# Patient Record
Sex: FEMALE | Race: WHITE | NOT HISPANIC OR LATINO
[De-identification: names, ages, dates, MRNs, and addresses within clinical notes are randomized per-mention and may not be internally consistent; named-entity substitution may affect disease eponyms.]

---

## 2017-07-24 ENCOUNTER — FORM ENCOUNTER (OUTPATIENT)
Age: 67
End: 2017-07-24

## 2017-07-25 ENCOUNTER — OUTPATIENT (OUTPATIENT)
Dept: OUTPATIENT SERVICES | Facility: HOSPITAL | Age: 67
LOS: 1 days | End: 2017-07-25
Payer: MEDICARE

## 2017-07-25 ENCOUNTER — APPOINTMENT (OUTPATIENT)
Dept: ORTHOPEDIC SURGERY | Facility: CLINIC | Age: 67
End: 2017-07-25
Payer: MEDICARE

## 2017-07-25 VITALS
BODY MASS INDEX: 22.02 KG/M2 | DIASTOLIC BLOOD PRESSURE: 70 MMHG | WEIGHT: 137 LBS | HEIGHT: 66 IN | SYSTOLIC BLOOD PRESSURE: 120 MMHG

## 2017-07-25 PROCEDURE — 73564 X-RAY EXAM KNEE 4 OR MORE: CPT

## 2017-07-25 PROCEDURE — 99214 OFFICE O/P EST MOD 30 MIN: CPT

## 2017-07-25 PROCEDURE — 73564 X-RAY EXAM KNEE 4 OR MORE: CPT | Mod: 26,50

## 2017-11-02 ENCOUNTER — APPOINTMENT (OUTPATIENT)
Dept: OPHTHALMOLOGY | Facility: CLINIC | Age: 67
End: 2017-11-02
Payer: MEDICARE

## 2017-11-02 PROCEDURE — 92136 OPHTHALMIC BIOMETRY: CPT

## 2017-11-02 PROCEDURE — 92004 COMPRE OPH EXAM NEW PT 1/>: CPT

## 2017-11-02 PROCEDURE — 92286 ANT SGM IMG I&R SPECLR MIC: CPT

## 2017-11-02 PROCEDURE — 76514 ECHO EXAM OF EYE THICKNESS: CPT

## 2017-11-03 ENCOUNTER — APPOINTMENT (OUTPATIENT)
Dept: ORTHOPEDIC SURGERY | Facility: CLINIC | Age: 67
End: 2017-11-03
Payer: MEDICARE

## 2017-11-03 PROCEDURE — 20610 DRAIN/INJ JOINT/BURSA W/O US: CPT | Mod: LT

## 2017-11-13 ENCOUNTER — TRANSCRIPTION ENCOUNTER (OUTPATIENT)
Age: 67
End: 2017-11-13

## 2017-12-29 ENCOUNTER — MEDICATION RENEWAL (OUTPATIENT)
Age: 67
End: 2017-12-29

## 2018-01-08 ENCOUNTER — OUTPATIENT (OUTPATIENT)
Dept: OUTPATIENT SERVICES | Facility: HOSPITAL | Age: 68
LOS: 1 days | Discharge: HOME | End: 2018-01-08

## 2018-01-08 ENCOUNTER — APPOINTMENT (OUTPATIENT)
Dept: OPHTHALMOLOGY | Facility: AMBULATORY SURGERY CENTER | Age: 68
End: 2018-01-08
Payer: MEDICARE

## 2018-01-08 PROCEDURE — 66984 XCAPSL CTRC RMVL W/O ECP: CPT | Mod: LT

## 2018-01-09 ENCOUNTER — APPOINTMENT (OUTPATIENT)
Dept: OPHTHALMOLOGY | Facility: CLINIC | Age: 68
End: 2018-01-09
Payer: COMMERCIAL

## 2018-01-09 PROCEDURE — 99024 POSTOP FOLLOW-UP VISIT: CPT

## 2018-01-16 ENCOUNTER — APPOINTMENT (OUTPATIENT)
Dept: OPHTHALMOLOGY | Facility: CLINIC | Age: 68
End: 2018-01-16
Payer: MEDICARE

## 2018-01-16 PROCEDURE — 99024 POSTOP FOLLOW-UP VISIT: CPT

## 2018-02-06 ENCOUNTER — APPOINTMENT (OUTPATIENT)
Dept: OPHTHALMOLOGY | Facility: CLINIC | Age: 68
End: 2018-02-06
Payer: MEDICARE

## 2018-02-06 PROCEDURE — 99024 POSTOP FOLLOW-UP VISIT: CPT

## 2018-02-28 ENCOUNTER — APPOINTMENT (OUTPATIENT)
Dept: OPHTHALMOLOGY | Facility: CLINIC | Age: 68
End: 2018-02-28
Payer: MEDICARE

## 2018-02-28 DIAGNOSIS — H25.812 COMBINED FORMS OF AGE-RELATED CATARACT, LEFT EYE: ICD-10-CM

## 2018-02-28 PROCEDURE — 92225: CPT | Mod: RT

## 2018-02-28 PROCEDURE — 92014 COMPRE OPH EXAM EST PT 1/>: CPT | Mod: 57

## 2018-02-28 PROCEDURE — 92134 CPTRZ OPH DX IMG PST SGM RTA: CPT

## 2018-02-28 PROCEDURE — 67145 PROPH RTA DTCHMNT PC: CPT | Mod: 79,LT

## 2018-03-02 ENCOUNTER — APPOINTMENT (OUTPATIENT)
Dept: OPHTHALMOLOGY | Facility: CLINIC | Age: 68
End: 2018-03-02
Payer: MEDICARE

## 2018-03-02 DIAGNOSIS — Z87.828 PERSONAL HISTORY OF OTHER (HEALED) PHYSICAL INJURY AND TRAUMA: ICD-10-CM

## 2018-03-02 PROCEDURE — 92226: CPT | Mod: LT

## 2018-03-02 PROCEDURE — 99024 POSTOP FOLLOW-UP VISIT: CPT

## 2018-03-08 ENCOUNTER — APPOINTMENT (OUTPATIENT)
Dept: OPHTHALMOLOGY | Facility: CLINIC | Age: 68
End: 2018-03-08
Payer: MEDICARE

## 2018-03-08 PROCEDURE — 99024 POSTOP FOLLOW-UP VISIT: CPT

## 2018-03-28 ENCOUNTER — APPOINTMENT (OUTPATIENT)
Dept: OPHTHALMOLOGY | Facility: CLINIC | Age: 68
End: 2018-03-28
Payer: MEDICARE

## 2018-03-28 PROCEDURE — 99024 POSTOP FOLLOW-UP VISIT: CPT

## 2018-05-08 ENCOUNTER — APPOINTMENT (OUTPATIENT)
Dept: ORTHOPEDIC SURGERY | Facility: CLINIC | Age: 68
End: 2018-05-08
Payer: MEDICARE

## 2018-05-08 VITALS
SYSTOLIC BLOOD PRESSURE: 120 MMHG | WEIGHT: 139 LBS | BODY MASS INDEX: 22.34 KG/M2 | DIASTOLIC BLOOD PRESSURE: 70 MMHG | HEIGHT: 66 IN

## 2018-05-08 PROCEDURE — 20610 DRAIN/INJ JOINT/BURSA W/O US: CPT | Mod: 50

## 2018-05-08 PROCEDURE — 99214 OFFICE O/P EST MOD 30 MIN: CPT | Mod: 25

## 2018-05-08 RX ORDER — MOXIFLOXACIN OPHTHALMIC 5 MG/ML
0.5 SOLUTION/ DROPS OPHTHALMIC 4 TIMES DAILY
Qty: 3 | Refills: 1 | Status: COMPLETED | COMMUNITY
Start: 2017-12-29 | End: 2018-05-08

## 2018-05-08 RX ORDER — DIFLUPREDNATE 0.5 MG/ML
0.05 EMULSION OPHTHALMIC
Qty: 5 | Refills: 6 | Status: COMPLETED | COMMUNITY
Start: 2017-12-29 | End: 2018-05-08

## 2018-05-09 ENCOUNTER — APPOINTMENT (OUTPATIENT)
Dept: OPHTHALMOLOGY | Facility: CLINIC | Age: 68
End: 2018-05-09
Payer: MEDICARE

## 2018-05-09 DIAGNOSIS — H35.413 LATTICE DEGENERATION OF RETINA, BILATERAL: ICD-10-CM

## 2018-05-09 PROCEDURE — 99024 POSTOP FOLLOW-UP VISIT: CPT

## 2018-06-13 ENCOUNTER — RX RENEWAL (OUTPATIENT)
Age: 68
End: 2018-06-13

## 2018-07-12 ENCOUNTER — APPOINTMENT (OUTPATIENT)
Dept: ORTHOPEDIC SURGERY | Facility: CLINIC | Age: 68
End: 2018-07-12
Payer: MEDICARE

## 2018-07-12 PROCEDURE — 99214 OFFICE O/P EST MOD 30 MIN: CPT | Mod: 25

## 2018-07-12 PROCEDURE — 20610 DRAIN/INJ JOINT/BURSA W/O US: CPT | Mod: 50

## 2018-09-14 ENCOUNTER — RX RENEWAL (OUTPATIENT)
Age: 68
End: 2018-09-14

## 2018-10-30 ENCOUNTER — APPOINTMENT (OUTPATIENT)
Dept: ORTHOPEDIC SURGERY | Facility: CLINIC | Age: 68
End: 2018-10-30
Payer: MEDICARE

## 2018-10-30 VITALS — SYSTOLIC BLOOD PRESSURE: 120 MMHG | OXYGEN SATURATION: 98 % | DIASTOLIC BLOOD PRESSURE: 80 MMHG | HEART RATE: 79 BPM

## 2018-10-30 DIAGNOSIS — S83.241A OTHER TEAR OF MEDIAL MENISCUS, CURRENT INJURY, RIGHT KNEE, INITIAL ENCOUNTER: ICD-10-CM

## 2018-10-30 PROCEDURE — 99214 OFFICE O/P EST MOD 30 MIN: CPT

## 2018-11-07 ENCOUNTER — APPOINTMENT (OUTPATIENT)
Dept: OPHTHALMOLOGY | Facility: CLINIC | Age: 68
End: 2018-11-07
Payer: MEDICARE

## 2018-11-07 DIAGNOSIS — H35.433 PAVING STONE DEGENERATION OF RETINA, BILATERAL: ICD-10-CM

## 2018-11-07 DIAGNOSIS — Z96.1 PRESENCE OF INTRAOCULAR LENS: ICD-10-CM

## 2018-11-07 DIAGNOSIS — H33.312 HORSESHOE TEAR OF RETINA W/OUT DETACHMENT, LEFT EYE: ICD-10-CM

## 2018-11-07 DIAGNOSIS — H25.811 COMBINED FORMS OF AGE-RELATED CATARACT, RIGHT EYE: ICD-10-CM

## 2018-11-07 DIAGNOSIS — H18.51 ENDOTHELIAL CORNEAL DYSTROPHY: ICD-10-CM

## 2018-11-07 DIAGNOSIS — H43.811 VITREOUS DEGENERATION, RIGHT EYE: ICD-10-CM

## 2018-11-07 DIAGNOSIS — H43.812 VITREOUS DEGENERATION, LEFT EYE: ICD-10-CM

## 2018-11-07 DIAGNOSIS — H35.372 PUCKERING OF MACULA, LEFT EYE: ICD-10-CM

## 2018-11-07 PROBLEM — S83.241A ACUTE MEDIAL MENISCUS TEAR OF RIGHT KNEE, INITIAL ENCOUNTER: Status: ACTIVE | Noted: 2018-10-30

## 2018-11-07 PROCEDURE — 92134 CPTRZ OPH DX IMG PST SGM RTA: CPT

## 2018-11-07 PROCEDURE — 92014 COMPRE OPH EXAM EST PT 1/>: CPT

## 2018-12-11 ENCOUNTER — APPOINTMENT (OUTPATIENT)
Dept: ORTHOPEDIC SURGERY | Facility: CLINIC | Age: 68
End: 2018-12-11
Payer: MEDICARE

## 2018-12-11 VITALS
HEART RATE: 85 BPM | OXYGEN SATURATION: 98 % | SYSTOLIC BLOOD PRESSURE: 120 MMHG | WEIGHT: 139 LBS | HEIGHT: 66 IN | DIASTOLIC BLOOD PRESSURE: 70 MMHG | BODY MASS INDEX: 22.34 KG/M2

## 2018-12-11 PROCEDURE — 20610 DRAIN/INJ JOINT/BURSA W/O US: CPT | Mod: 50

## 2018-12-11 PROCEDURE — 99213 OFFICE O/P EST LOW 20 MIN: CPT | Mod: 25

## 2019-03-19 ENCOUNTER — APPOINTMENT (OUTPATIENT)
Dept: ORTHOPEDIC SURGERY | Facility: CLINIC | Age: 69
End: 2019-03-19
Payer: MEDICARE

## 2019-03-19 VITALS — OXYGEN SATURATION: 98 % | HEIGHT: 66 IN | WEIGHT: 140 LBS | HEART RATE: 85 BPM | BODY MASS INDEX: 22.5 KG/M2

## 2019-03-19 PROCEDURE — 99213 OFFICE O/P EST LOW 20 MIN: CPT | Mod: 25

## 2019-03-19 PROCEDURE — 20610 DRAIN/INJ JOINT/BURSA W/O US: CPT | Mod: 50

## 2019-03-22 ENCOUNTER — TRANSCRIPTION ENCOUNTER (OUTPATIENT)
Age: 69
End: 2019-03-22

## 2019-03-22 NOTE — PHYSICAL EXAM
[de-identified] : The patient is a well developed, well nourished female in no apparent distress. She is alert and oriented X 3 with a pleasant mood and appropriate affect. \par \par On physical examination of the right knee, there is full range of motion. The patient walks with a normal gait and stands in neutral alignment. There is trace effusion. No warmth or erythema is noted. The patella is tender to palpation medially and laterally. There is patellofemoral crepitus noted. The apprehension and grind tests are negative. The extensor mechanism is intact. There is medial joint line tenderness. The Patricio sign is absent. The Lachman and pivot shift tests are negative. There is no varus or valgus laxity at 0 or 30 degrees. No posterolateral or anteromedial laxity is noted. No masses are palpable. No other soft tissue or bony tenderness is noted. There is some tenderness noted on palpation of the IT band. Quadriceps weakness is noted. Neurovascular function is intact. \par \par On physical examination of the left knee, her ROM is 5-120 degrees. The patient walks with a normal gait and stands in neutral alignment. There is trace effusion. No warmth or erythema is noted. The patella is tender to palpation medially and laterally. There is patellofemoral crepitus noted. The apprehension and grind tests are negative. The extensor mechanism is intact. There is medial joint line tenderness. The Patricio sign is absent. The Lachman and pivot shift tests are negative. There is no varus or valgus laxity at 0 or 30 degrees. No posterolateral or anteromedial laxity is noted. No masses are palpable. No other soft tissue or bony tenderness is noted. There is some tenderness noted on palpation of the IT band. Quadriceps weakness is noted. Neurovascular function is intact. \par

## 2019-03-22 NOTE — HISTORY OF PRESENT ILLNESS
[de-identified] : Kacey returns today for evaluation of both knees. She reports progressive bilateral knee pain and swelling. She has had difficulty walking even short distances. She has difficulty exercising and pain with stairs. She denies any locking or buckling. She did not have much relief with her last cortisone injections in December.

## 2019-03-22 NOTE — DISCUSSION/SUMMARY
[de-identified] : Kacey has symptomatic DJD in both knees. She received Monovisc injections today in both knees. She will slowly increase her activities as tolerated. We will see her back on an as needed basis. All questions were answered. She understands that she will ultimately require knee replacement in the future. She will call if any issues arise.

## 2019-03-22 NOTE — PROCEDURE
[de-identified] : Indication:Bilateral knee DJD \par CC; Bilateral knee pain and stiffness\par \par Under strict sterile technique, both knees were  prepped with Betadine. Using the superolateral approach, with the patient supine, a 4mL injection of Monovisc was administered intra-articularly into each knee. The patient tolerated the procedure well. The patient was instructed to avoid vigorous exercise for 48 hours and will apply ice to the knee for 20 minutes 2-3 times per day if discomfort occurs. Patient will return on an as needed basis. The patient will call if any questions or problems should arise. \par \par MonoVisc injection - Bilateral Knees\par Lot # [id number]\par Exp: 07-\par Man: [employer]\par NDC: [id number]

## 2019-08-07 ENCOUNTER — NON-APPOINTMENT (OUTPATIENT)
Age: 69
End: 2019-08-07

## 2019-08-07 ENCOUNTER — APPOINTMENT (OUTPATIENT)
Dept: OPHTHALMOLOGY | Facility: CLINIC | Age: 69
End: 2019-08-07
Payer: MEDICARE

## 2019-08-07 PROCEDURE — 92014 COMPRE OPH EXAM EST PT 1/>: CPT

## 2019-08-07 PROCEDURE — 92134 CPTRZ OPH DX IMG PST SGM RTA: CPT

## 2019-09-16 ENCOUNTER — RX RENEWAL (OUTPATIENT)
Age: 69
End: 2019-09-16

## 2019-09-24 ENCOUNTER — APPOINTMENT (OUTPATIENT)
Dept: ORTHOPEDIC SURGERY | Facility: CLINIC | Age: 69
End: 2019-09-24
Payer: MEDICARE

## 2019-09-24 PROCEDURE — 20610 DRAIN/INJ JOINT/BURSA W/O US: CPT | Mod: 50

## 2019-10-02 NOTE — PROCEDURE
[de-identified] : Indication:\par \par Under strict sterile technique, both knees were prepped with Betadine. Using the superolateral approach, with the patient supine, a4mL injection of Monovisc was administered intra-articularly into each knee. The patient tolerated the procedure well. The patient was instructed to avoid vigorous exercise for 48 hours and will apply ice to the knee for 20 minutes 2-3 times per day if discomfort occurs. Patient will return on an as needed basis. The patient will call if any questions or problems should arise. \par \par \par \par MonoVisc injection - Bilateral knee joints\par Lot #: [id number]\par Exp: 03-31-20222\par Man: [employer]\par NDC: 56611-87928-3

## 2019-10-18 ENCOUNTER — APPOINTMENT (OUTPATIENT)
Dept: OPHTHALMOLOGY | Facility: CLINIC | Age: 69
End: 2019-10-18
Payer: MEDICARE

## 2019-10-18 ENCOUNTER — NON-APPOINTMENT (OUTPATIENT)
Age: 69
End: 2019-10-18

## 2019-10-18 PROCEDURE — 92250 FUNDUS PHOTOGRAPHY W/I&R: CPT

## 2019-10-18 PROCEDURE — 92012 INTRM OPH EXAM EST PATIENT: CPT

## 2019-11-18 ENCOUNTER — APPOINTMENT (OUTPATIENT)
Dept: OPHTHALMOLOGY | Facility: CLINIC | Age: 69
End: 2019-11-18
Payer: MEDICARE

## 2019-11-18 ENCOUNTER — NON-APPOINTMENT (OUTPATIENT)
Age: 69
End: 2019-11-18

## 2019-11-18 PROCEDURE — 92226: CPT | Mod: LT

## 2019-11-18 PROCEDURE — 92014 COMPRE OPH EXAM EST PT 1/>: CPT

## 2019-11-18 PROCEDURE — 92134 CPTRZ OPH DX IMG PST SGM RTA: CPT

## 2020-01-10 ENCOUNTER — RX RENEWAL (OUTPATIENT)
Age: 70
End: 2020-01-10

## 2020-04-20 ENCOUNTER — NON-APPOINTMENT (OUTPATIENT)
Age: 70
End: 2020-04-20

## 2020-04-20 ENCOUNTER — APPOINTMENT (OUTPATIENT)
Dept: OPHTHALMOLOGY | Facility: CLINIC | Age: 70
End: 2020-04-20
Payer: MEDICARE

## 2020-04-20 PROCEDURE — 99213 OFFICE O/P EST LOW 20 MIN: CPT | Mod: 95

## 2020-08-11 ENCOUNTER — APPOINTMENT (OUTPATIENT)
Dept: OPHTHALMOLOGY | Facility: CLINIC | Age: 70
End: 2020-08-11

## 2020-08-12 ENCOUNTER — APPOINTMENT (OUTPATIENT)
Dept: OPHTHALMOLOGY | Facility: CLINIC | Age: 70
End: 2020-08-12

## 2020-09-21 ENCOUNTER — APPOINTMENT (OUTPATIENT)
Dept: OPHTHALMOLOGY | Facility: CLINIC | Age: 70
End: 2020-09-21

## 2020-09-22 ENCOUNTER — APPOINTMENT (OUTPATIENT)
Dept: ORTHOPEDIC SURGERY | Facility: CLINIC | Age: 70
End: 2020-09-22
Payer: MEDICARE

## 2020-09-22 PROCEDURE — 20610 DRAIN/INJ JOINT/BURSA W/O US: CPT | Mod: 50

## 2020-09-23 NOTE — PROCEDURE
[de-identified] : CC: bilateral knee pain and stiffness\par DX; Bilateral knee DJD \par \par \par Under strict sterile technique,both knees were prepped with Betadine. Using the superolateral approach, with the patient supine, a 4mL injection of Monovisc was administered intra-articularly into each knee. The patient tolerated the procedure well. The patient was instructed to avoid vigorous exercise for 48 hours and will apply ice to the knee for 20 minutes 2-3 times per day if discomfort occurs. Patient will return on an as needed basis. The patient will call if any questions or problems should arise. \par \par Lot [id number]\par 06/22\par

## 2021-03-01 ENCOUNTER — APPOINTMENT (OUTPATIENT)
Dept: ORTHOPEDIC SURGERY | Facility: CLINIC | Age: 71
End: 2021-03-01
Payer: MEDICARE

## 2021-03-01 VITALS
TEMPERATURE: 98.4 F | HEART RATE: 110 BPM | OXYGEN SATURATION: 98 % | WEIGHT: 140 LBS | DIASTOLIC BLOOD PRESSURE: 90 MMHG | BODY MASS INDEX: 22.5 KG/M2 | SYSTOLIC BLOOD PRESSURE: 160 MMHG | HEIGHT: 66 IN

## 2021-03-01 DIAGNOSIS — M17.12 UNILATERAL PRIMARY OSTEOARTHRITIS, LEFT KNEE: ICD-10-CM

## 2021-03-01 PROCEDURE — 99213 OFFICE O/P EST LOW 20 MIN: CPT

## 2021-03-01 RX ORDER — ETODOLAC 400 MG/1
400 TABLET, FILM COATED ORAL TWICE DAILY
Qty: 60 | Refills: 2 | Status: ACTIVE | COMMUNITY
Start: 2021-03-01 | End: 1900-01-01

## 2021-03-02 NOTE — HISTORY OF PRESENT ILLNESS
[de-identified] : Kacey returns today for evalaution of her left knee. She has a longstanding h.o DJD in her left knee. She has managed well in the past with HA injections. She works with a  several days per week. She was doing well until last week. She developed posterior knee pain and hamstring tightness after working out. She has had loss of motion and difficulty walking. She has been icing and alternating heat on and has been doing a gentle stretching program with some relief. She denies any locking or buckling.

## 2021-03-02 NOTE — END OF VISIT
[FreeTextEntry3] : All medical record entries made by SHELBY Charles, acting as a scribe for this encounter under the direction of lCarke Quevedo MD . I have reviewed the chart and agree that the record accurately reflects my personal performance of the history, physical exam, assessment and plan. I have also personally directed, reviewed, and agreed with the chart.

## 2021-03-02 NOTE — PHYSICAL EXAM
[de-identified] : The patient is a well developed, well nourished female in no apparent distress. She is alert and oriented X 3 with a pleasant mood and appropriate affect. \par \par On physical examination of the right knee, there is full range of motion. The patient walks with a normal gait and stands in neutral alignment. There is trace effusion. No warmth or erythema is noted. The patella is tender to palpation medially and laterally. There is patellofemoral crepitus noted. The apprehension and grind tests are negative. The extensor mechanism is intact. There is medial joint line tenderness. The Patricio sign is absent. The Lachman and pivot shift tests are negative. There is no varus or valgus laxity at 0 or 30 degrees. No posterolateral or anteromedial laxity is noted. No masses are palpable. No other soft tissue or bony tenderness is noted. There is some tenderness noted on palpation of the IT band. Quadriceps weakness is noted. Neurovascular function is intact. \par \par On physical examination of the left knee, her ROM is 5-120 degrees. The patient walks with a normal gait and stands in neutral alignment. There is trace effusion. No warmth or erythema is noted. The patella is tender to palpation medially and laterally. There is patellofemoral crepitus noted. The apprehension and grind tests are negative. The extensor mechanism is intact. There is medial joint line tenderness. The Patricio sign is absent. The Lachman and pivot shift tests are negative. There is no varus or valgus laxity at 0 or 30 degrees. No posterolateral or anteromedial laxity is noted. No masses are palpable. No other soft tissue or bony tenderness is noted. There is some tenderness noted on palpation of the IT band. Quadriceps weakness is noted. Neurovascular function is intact. \par

## 2021-03-02 NOTE — DISCUSSION/SUMMARY
[de-identified] : Kacey has had a flare of her DJD and may have a superimposed hamstring strain. She will begin a two week course of Lodine 400mg bid with food. We will see her back in the spring for another round of HA injections. she understands that she will ultimately require knee replacement. All questions were answered. She will call if any issues arise.

## 2021-03-03 ENCOUNTER — APPOINTMENT (OUTPATIENT)
Dept: OPHTHALMOLOGY | Facility: CLINIC | Age: 71
End: 2021-03-03
Payer: MEDICARE

## 2021-03-03 ENCOUNTER — NON-APPOINTMENT (OUTPATIENT)
Age: 71
End: 2021-03-03

## 2021-03-03 PROCEDURE — 92134 CPTRZ OPH DX IMG PST SGM RTA: CPT

## 2021-03-03 PROCEDURE — 92201 OPSCPY EXTND RTA DRAW UNI/BI: CPT

## 2021-03-03 PROCEDURE — 92014 COMPRE OPH EXAM EST PT 1/>: CPT

## 2021-04-06 ENCOUNTER — APPOINTMENT (OUTPATIENT)
Dept: ORTHOPEDIC SURGERY | Facility: CLINIC | Age: 71
End: 2021-04-06
Payer: MEDICARE

## 2021-04-06 VITALS
WEIGHT: 140 LBS | BODY MASS INDEX: 22.5 KG/M2 | SYSTOLIC BLOOD PRESSURE: 120 MMHG | OXYGEN SATURATION: 98 % | HEART RATE: 102 BPM | HEIGHT: 66 IN | TEMPERATURE: 98.4 F | DIASTOLIC BLOOD PRESSURE: 80 MMHG

## 2021-04-06 PROCEDURE — 20610 DRAIN/INJ JOINT/BURSA W/O US: CPT | Mod: 50

## 2021-04-07 NOTE — PROCEDURE
[de-identified] : cc: bilateral knee pain and stiffness\par DX: bilateral knee DJD \par \par Under strict sterile technique, both knees were  prepped with Betadine. Using the superolateral approach, with the patient supine, a 4mL injection of Monovisc  was administered intra-articularly into each knee The patient tolerated the procedure well. The patient was instructed to avoid vigorous exercise for 48 hours and will apply ice to the knee for 20 minutes 2-3 times per day if discomfort occurs. Patient will return on an as needed basis. The patient will call if any questions or problems should arise.\par \par MonoVisc injection - Bilateral knee joints\par Lot #: [id number]\par Exp: 06-\par Man: [employer]\par NDC: [id number]

## 2021-10-08 ENCOUNTER — NON-APPOINTMENT (OUTPATIENT)
Age: 71
End: 2021-10-08

## 2021-10-08 ENCOUNTER — APPOINTMENT (OUTPATIENT)
Dept: ORTHOPEDIC SURGERY | Facility: CLINIC | Age: 71
End: 2021-10-08
Payer: MEDICARE

## 2021-10-08 ENCOUNTER — RESULT REVIEW (OUTPATIENT)
Age: 71
End: 2021-10-08

## 2021-10-08 ENCOUNTER — OUTPATIENT (OUTPATIENT)
Dept: OUTPATIENT SERVICES | Facility: HOSPITAL | Age: 71
LOS: 1 days | End: 2021-10-08
Payer: MEDICARE

## 2021-10-08 VITALS
TEMPERATURE: 98.2 F | WEIGHT: 140 LBS | HEART RATE: 110 BPM | DIASTOLIC BLOOD PRESSURE: 84 MMHG | OXYGEN SATURATION: 98 % | HEIGHT: 66 IN | BODY MASS INDEX: 22.5 KG/M2 | SYSTOLIC BLOOD PRESSURE: 120 MMHG

## 2021-10-08 DIAGNOSIS — M17.0 BILATERAL PRIMARY OSTEOARTHRITIS OF KNEE: ICD-10-CM

## 2021-10-08 PROCEDURE — 73564 X-RAY EXAM KNEE 4 OR MORE: CPT | Mod: 26,50

## 2021-10-08 PROCEDURE — 20610 DRAIN/INJ JOINT/BURSA W/O US: CPT | Mod: 50

## 2021-10-08 PROCEDURE — 73564 X-RAY EXAM KNEE 4 OR MORE: CPT

## 2021-10-19 PROBLEM — M17.0 PRIMARY OSTEOARTHRITIS OF BOTH KNEES: Status: ACTIVE | Noted: 2018-05-11

## 2021-10-19 NOTE — PROCEDURE
[de-identified] : cc: bilateral knee pain and stiffness\par DX: bilateral knee DJD\par \par Radiographs performed today show advanced tricompartment DJD in both knees\par \par Under strict sterile technique, both knees were prepped with Betadine. Using the superolateral approach, with the patient supine, a 4mL injection of Monovisc was administered intra-articularly into each knee. The patient tolerated the procedure well. The patient was instructed to avoid vigorous exercise for 48 hours and will apply ice to the knee for 20 minutes 2-3 times per day if discomfort occurs. Patient will return on an as needed basis. The patient will call if any questions or problems should arise. \par \par MonoVisc injection - Bilateral knee joints\par Lot #: [id number]\par Exp: 03-\par Man: [employer]\par NDC: [address]-90

## 2022-02-28 ENCOUNTER — NON-APPOINTMENT (OUTPATIENT)
Age: 72
End: 2022-02-28

## 2022-02-28 ENCOUNTER — APPOINTMENT (OUTPATIENT)
Dept: OPHTHALMOLOGY | Facility: CLINIC | Age: 72
End: 2022-02-28
Payer: MEDICARE

## 2022-02-28 PROCEDURE — 92201 OPSCPY EXTND RTA DRAW UNI/BI: CPT

## 2022-02-28 PROCEDURE — 92134 CPTRZ OPH DX IMG PST SGM RTA: CPT

## 2022-02-28 PROCEDURE — 92014 COMPRE OPH EXAM EST PT 1/>: CPT

## 2022-03-24 ENCOUNTER — APPOINTMENT (OUTPATIENT)
Dept: ORTHOPEDIC SURGERY | Facility: CLINIC | Age: 72
End: 2022-03-24
Payer: MEDICARE

## 2022-03-24 VITALS — WEIGHT: 142 LBS | BODY MASS INDEX: 22.82 KG/M2 | RESPIRATION RATE: 16 BRPM | HEIGHT: 66 IN

## 2022-03-24 DIAGNOSIS — M18.11 UNILATERAL PRIMARY OSTEOARTHRITIS OF FIRST CARPOMETACARPAL JOINT, RIGHT HAND: ICD-10-CM

## 2022-03-24 DIAGNOSIS — M25.531 PAIN IN RIGHT WRIST: ICD-10-CM

## 2022-03-24 PROCEDURE — 99203 OFFICE O/P NEW LOW 30 MIN: CPT

## 2022-03-24 PROCEDURE — 73110 X-RAY EXAM OF WRIST: CPT | Mod: 50

## 2023-04-12 ENCOUNTER — NON-APPOINTMENT (OUTPATIENT)
Age: 73
End: 2023-04-12

## 2023-04-12 ENCOUNTER — APPOINTMENT (OUTPATIENT)
Dept: OPHTHALMOLOGY | Facility: CLINIC | Age: 73
End: 2023-04-12
Payer: MEDICARE

## 2023-04-12 PROCEDURE — 92014 COMPRE OPH EXAM EST PT 1/>: CPT

## 2023-04-12 PROCEDURE — 92201 OPSCPY EXTND RTA DRAW UNI/BI: CPT

## 2023-04-12 PROCEDURE — 92134 CPTRZ OPH DX IMG PST SGM RTA: CPT

## 2023-04-21 ENCOUNTER — APPOINTMENT (OUTPATIENT)
Dept: OPHTHALMOLOGY | Facility: CLINIC | Age: 73
End: 2023-04-21

## 2023-05-11 ENCOUNTER — NON-APPOINTMENT (OUTPATIENT)
Age: 73
End: 2023-05-11

## 2023-05-11 ENCOUNTER — APPOINTMENT (OUTPATIENT)
Dept: OPHTHALMOLOGY | Facility: CLINIC | Age: 73
End: 2023-05-11
Payer: MEDICARE

## 2023-05-11 PROCEDURE — 92015 DETERMINE REFRACTIVE STATE: CPT

## 2023-07-26 ENCOUNTER — NON-APPOINTMENT (OUTPATIENT)
Age: 73
End: 2023-07-26

## 2024-05-16 ENCOUNTER — APPOINTMENT (OUTPATIENT)
Dept: OPHTHALMOLOGY | Facility: CLINIC | Age: 74
End: 2024-05-16
Payer: MEDICARE

## 2024-05-16 ENCOUNTER — NON-APPOINTMENT (OUTPATIENT)
Age: 74
End: 2024-05-16

## 2024-05-16 PROCEDURE — 92134 CPTRZ OPH DX IMG PST SGM RTA: CPT

## 2024-05-16 PROCEDURE — 92014 COMPRE OPH EXAM EST PT 1/>: CPT

## 2024-05-16 PROCEDURE — 92201 OPSCPY EXTND RTA DRAW UNI/BI: CPT

## 2024-05-17 ENCOUNTER — NON-APPOINTMENT (OUTPATIENT)
Age: 74
End: 2024-05-17

## 2024-05-17 ENCOUNTER — APPOINTMENT (OUTPATIENT)
Dept: OPHTHALMOLOGY | Facility: CLINIC | Age: 74
End: 2024-05-17
Payer: COMMERCIAL

## 2024-05-17 PROCEDURE — 92015 DETERMINE REFRACTIVE STATE: CPT

## 2024-12-24 PROBLEM — H25.812 COMBINED FORM OF SENILE CATARACT OF LEFT EYE: Status: RESOLVED | Noted: 2017-11-02 | Resolved: 2024-12-24

## 2025-06-05 ENCOUNTER — NON-APPOINTMENT (OUTPATIENT)
Age: 75
End: 2025-06-05

## 2025-06-05 ENCOUNTER — APPOINTMENT (OUTPATIENT)
Dept: OPHTHALMOLOGY | Facility: CLINIC | Age: 75
End: 2025-06-05

## 2025-06-05 PROCEDURE — 92134 CPTRZ OPH DX IMG PST SGM RTA: CPT

## 2025-06-05 PROCEDURE — 92014 COMPRE OPH EXAM EST PT 1/>: CPT
